# Patient Record
Sex: FEMALE | Race: WHITE | Employment: OTHER | URBAN - NONMETROPOLITAN AREA
[De-identification: names, ages, dates, MRNs, and addresses within clinical notes are randomized per-mention and may not be internally consistent; named-entity substitution may affect disease eponyms.]

---

## 2020-11-07 ENCOUNTER — HOSPITAL ENCOUNTER (EMERGENCY)
Age: 60
Discharge: HOME | End: 2020-11-07

## 2020-11-07 VITALS
HEIGHT: 68 IN | SYSTOLIC BLOOD PRESSURE: 182 MMHG | WEIGHT: 180 LBS | BODY MASS INDEX: 27.28 KG/M2 | HEART RATE: 84 BPM | RESPIRATION RATE: 18 BRPM | DIASTOLIC BLOOD PRESSURE: 78 MMHG | TEMPERATURE: 98.5 F | OXYGEN SATURATION: 96 %

## 2020-11-07 LAB
BASOPHILS # BLD: 0.5 %
BASOPHILS ABSOLUTE: 0.1 THOU/MM3 (ref 0–0.1)
EOSINOPHIL # BLD: 1.6 %
EOSINOPHILS ABSOLUTE: 0.2 THOU/MM3 (ref 0–0.4)
HCT VFR BLD CALC: 38.2 % (ref 37–47)
HEMOGLOBIN: 13.1 GM/DL (ref 12–16)
IMMATURE GRANS (ABS): 0.04 THOU/MM3 (ref 0–0.07)
IMMATURE GRANULOCYTES: 0.4 %
LYMPHOCYTES # BLD: 19.7 %
LYMPHOCYTES ABSOLUTE: 2.1 THOU/MM3 (ref 1–4.8)
MCH RBC QN AUTO: 29.8 PG (ref 27–31)
MCHC RBC AUTO-ENTMCNC: 34.3 GM/DL (ref 33–37)
MCV RBC AUTO: 87 FL (ref 81–99)
MONOCYTES # BLD: 11.9 %
MONOCYTES ABSOLUTE: 1.3 THOU/MM3 (ref 0.4–1.3)
NUCLEATED RED BLOOD CELLS: 0 /100 WBC
PDW BLD-RTO: 12.9 % (ref 11.5–14.5)
PLATELET # BLD: 276 THOU/MM3 (ref 130–400)
PMV BLD AUTO: 9.4 FL (ref 7.4–10.4)
RBC # BLD: 4.4 MILL/MM3 (ref 4.2–5.4)
SEG NEUTROPHILS: 65.9 %
SEGMENTED NEUTROPHILS ABSOLUTE COUNT: 7.1 THOU/MM3 (ref 1.8–7.7)
WBC # BLD: 10.8 THOU/MM3 (ref 4.8–10.8)

## 2020-11-07 PROCEDURE — 99203 OFFICE O/P NEW LOW 30 MIN: CPT | Performed by: NURSE PRACTITIONER

## 2020-11-07 PROCEDURE — 87147 CULTURE TYPE IMMUNOLOGIC: CPT

## 2020-11-07 PROCEDURE — 87075 CULTR BACTERIA EXCEPT BLOOD: CPT

## 2020-11-07 PROCEDURE — 87070 CULTURE OTHR SPECIMN AEROBIC: CPT

## 2020-11-07 PROCEDURE — 87205 SMEAR GRAM STAIN: CPT

## 2020-11-07 PROCEDURE — 99204 OFFICE O/P NEW MOD 45 MIN: CPT

## 2020-11-07 PROCEDURE — 36415 COLL VENOUS BLD VENIPUNCTURE: CPT

## 2020-11-07 PROCEDURE — 85025 COMPLETE CBC W/AUTO DIFF WBC: CPT

## 2020-11-07 RX ORDER — SULFAMETHOXAZOLE AND TRIMETHOPRIM 800; 160 MG/1; MG/1
1 TABLET ORAL 2 TIMES DAILY
Qty: 20 TABLET | Refills: 0 | Status: SHIPPED | OUTPATIENT
Start: 2020-11-07 | End: 2020-11-17

## 2020-11-07 RX ORDER — M-VIT,TX,IRON,MINS/CALC/FOLIC 27MG-0.4MG
1 TABLET ORAL DAILY
COMMUNITY

## 2020-11-07 ASSESSMENT — PAIN DESCRIPTION - DESCRIPTORS: DESCRIPTORS: DULL;ACHING

## 2020-11-07 ASSESSMENT — ENCOUNTER SYMPTOMS
SHORTNESS OF BREATH: 0
CHEST TIGHTNESS: 0
WHEEZING: 0
COUGH: 0

## 2020-11-07 ASSESSMENT — PAIN SCALES - GENERAL: PAINLEVEL_OUTOF10: 5

## 2020-11-07 ASSESSMENT — PAIN - FUNCTIONAL ASSESSMENT: PAIN_FUNCTIONAL_ASSESSMENT: PREVENTS OR INTERFERES SOME ACTIVE ACTIVITIES AND ADLS

## 2020-11-07 ASSESSMENT — PAIN DESCRIPTION - ORIENTATION: ORIENTATION: LEFT

## 2020-11-07 ASSESSMENT — PAIN DESCRIPTION - LOCATION: LOCATION: TOE (COMMENT WHICH ONE)

## 2020-11-07 ASSESSMENT — PAIN DESCRIPTION - PAIN TYPE: TYPE: ACUTE PAIN

## 2020-11-07 ASSESSMENT — PAIN DESCRIPTION - FREQUENCY: FREQUENCY: CONTINUOUS

## 2020-11-07 ASSESSMENT — PAIN DESCRIPTION - ONSET: ONSET: ON-GOING

## 2020-11-07 ASSESSMENT — PAIN DESCRIPTION - PROGRESSION: CLINICAL_PROGRESSION: GRADUALLY WORSENING

## 2020-11-07 NOTE — ED NOTES
Left foot and lower leg swelling noted with great toe reddened with open sore on bottom. Purulent blisters noted around great toe without current drainage. Pt co yellowish drainage last evening from top of toe.      Tiffanie Kay RN  11/07/20 6330

## 2020-11-07 NOTE — ED NOTES
PT GIVEN DISCHARGE INSTRUCTIONS, VERBALIZES UNDERSTANDING. PT ASSESSMENT UNCHANGED, DISCHARGED IN STABLE CONDITION.         Audrey Joyner RN  11/07/20 4739

## 2020-11-07 NOTE — ED PROVIDER NOTES
Patient  reports that she has never smoked. She has never used smokeless tobacco. She reports that she does not drink alcohol or use drugs. PHYSICAL EXAM     ED TRIAGE VITALS  BP: (!) 182/78, Temp: 98.5 °F (36.9 °C), Pulse: 84, Resp: 18, SpO2: 96 %,Estimated body mass index is 27.37 kg/m² as calculated from the following:    Height as of this encounter: 5' 8\" (1.727 m). Weight as of this encounter: 180 lb (81.6 kg). ,No LMP recorded. Physical Exam  Constitutional:       Appearance: Normal appearance. Cardiovascular:      Pulses: Normal pulses. Pulmonary:      Effort: Pulmonary effort is normal. No respiratory distress. Musculoskeletal: Normal range of motion. General: Swelling and tenderness (left great toe) present. No signs of injury. Skin:     General: Skin is warm. Capillary Refill: Capillary refill takes 2 to 3 seconds. Coloration: Skin is not jaundiced or pale. Findings: Erythema present. No bruising, lesion or rash. Neurological:      General: No focal deficit present. Mental Status: She is alert and oriented to person, place, and time. Sensory: No sensory deficit. Psychiatric:         Mood and Affect: Mood normal.         Behavior: Behavior normal.         Thought Content:  Thought content normal.         Judgment: Judgment normal.         DIAGNOSTIC RESULTS     Labs:  Results for orders placed or performed during the hospital encounter of 11/07/20   CBC auto differential   Result Value Ref Range    WBC 10.8 4.8 - 10.8 thou/mm3    RBC 4.40 4.20 - 5.40 mill/mm3    Hemoglobin 13.1 12.0 - 16.0 gm/dl    Hematocrit 38.2 37.0 - 47.0 %    MCV 87 81 - 99 fL    MCH 29.8 27.0 - 31.0 pg    MCHC 34.3 33.0 - 37.0 gm/dl    RDW 12.9 11.5 - 14.5 %    Platelets 863 239 - 170 thou/mm3    MPV 9.4 7.4 - 10.4 fL       IMAGING:    No orders to display     URGENT CARE COURSE:     Vitals:    11/07/20 1202   BP: (!) 182/78   Pulse: 84   Resp: 18   Temp: 98.5 °F (36.9 °C) TempSrc: Temporal   SpO2: 96%   Weight: 180 lb (81.6 kg)   Height: 5' 8\" (1.727 m)       Medications - No data to display         PROCEDURES:  None    FINAL IMPRESSION      1. Abscess    2. Cellulitis, unspecified cellulitis site          DISPOSITION/ PLAN   Patient is discharged home with prescription for Bactrim as well as Bactroban ointment. Discussed with patient that she will need to follow-up with primary care provider, or return to the urgent care within the next 2 to 3 days for reevaluation of wound, as there is concern of increased risk for poor wound healing or sepsis given her history of diabetes. Discussed with patient that she may use Epson salt soaks and adjunct to antibiotic. Aerobic and anaerobic cultures were sent, and can take up to 3 days to be resulted. Discussed with patient that labs were reviewed. If she develops any fevers, she should go directly to the ER. PATIENT REFERRED TO:  No primary care provider on file. No primary physician on file.       DISCHARGE MEDICATIONS:  Discharge Medication List as of 11/7/2020 12:55 PM      START taking these medications    Details   sulfamethoxazole-trimethoprim (BACTRIM DS;SEPTRA DS) 800-160 MG per tablet Take 1 tablet by mouth 2 times daily for 10 days, Disp-20 tablet,R-0Print      mupirocin (BACTROBAN) 2 % ointment Apply topically 3 times daily to left great toe for 1 week, Disp-1 Tube,R-0, Print             Discharge Medication List as of 11/7/2020 12:55 PM      STOP taking these medications       fish oil-omega-3 fatty acids 1000 MG capsule Comments:   Reason for Stopping:               Discharge Medication List as of 11/7/2020 12:55 PM          KRISTA Horne NP    (Please note that portions of this note were completed with a voice recognition program. Efforts were made to edit the dictations but occasionally words are mis-transcribed.)         KRISTA Chanel NP  11/07/20 5291

## 2020-11-09 ENCOUNTER — APPOINTMENT (OUTPATIENT)
Dept: GENERAL RADIOLOGY | Age: 60
End: 2020-11-09

## 2020-11-09 ENCOUNTER — HOSPITAL ENCOUNTER (EMERGENCY)
Age: 60
Discharge: HOME | End: 2020-11-09

## 2020-11-09 VITALS
SYSTOLIC BLOOD PRESSURE: 166 MMHG | TEMPERATURE: 97.7 F | DIASTOLIC BLOOD PRESSURE: 77 MMHG | RESPIRATION RATE: 14 BRPM | HEART RATE: 81 BPM | OXYGEN SATURATION: 96 %

## 2020-11-09 LAB
BASOPHILS # BLD: 0.7 %
BASOPHILS ABSOLUTE: 0.1 THOU/MM3 (ref 0–0.1)
EOSINOPHIL # BLD: 2.9 %
EOSINOPHILS ABSOLUTE: 0.2 THOU/MM3 (ref 0–0.4)
HCT VFR BLD CALC: 38.7 % (ref 37–47)
HEMOGLOBIN: 13.6 GM/DL (ref 12–16)
IMMATURE GRANS (ABS): 0.03 THOU/MM3 (ref 0–0.07)
IMMATURE GRANULOCYTES: 0.4 %
LYMPHOCYTES # BLD: 20.4 %
LYMPHOCYTES ABSOLUTE: 1.7 THOU/MM3 (ref 1–4.8)
MCH RBC QN AUTO: 30.8 PG (ref 27–31)
MCHC RBC AUTO-ENTMCNC: 35.1 GM/DL (ref 33–37)
MCV RBC AUTO: 88 FL (ref 81–99)
MONOCYTES # BLD: 12.6 %
MONOCYTES ABSOLUTE: 1 THOU/MM3 (ref 0.4–1.3)
NUCLEATED RED BLOOD CELLS: 0 /100 WBC
PDW BLD-RTO: 12.7 % (ref 11.5–14.5)
PLATELET # BLD: 305 THOU/MM3 (ref 130–400)
PMV BLD AUTO: 9.4 FL (ref 7.4–10.4)
RBC # BLD: 4.42 MILL/MM3 (ref 4.2–5.4)
SEG NEUTROPHILS: 63 %
SEGMENTED NEUTROPHILS ABSOLUTE COUNT: 5.2 THOU/MM3 (ref 1.8–7.7)
WBC # BLD: 8.3 THOU/MM3 (ref 4.8–10.8)

## 2020-11-09 PROCEDURE — 36415 COLL VENOUS BLD VENIPUNCTURE: CPT

## 2020-11-09 PROCEDURE — 85025 COMPLETE CBC W/AUTO DIFF WBC: CPT

## 2020-11-09 PROCEDURE — 99214 OFFICE O/P EST MOD 30 MIN: CPT

## 2020-11-09 PROCEDURE — 73630 X-RAY EXAM OF FOOT: CPT

## 2020-11-09 PROCEDURE — 99214 OFFICE O/P EST MOD 30 MIN: CPT | Performed by: NURSE PRACTITIONER

## 2020-11-09 RX ORDER — CLINDAMYCIN HYDROCHLORIDE 300 MG/1
300 CAPSULE ORAL 3 TIMES DAILY
Qty: 30 CAPSULE | Refills: 0 | Status: SHIPPED | OUTPATIENT
Start: 2020-11-09 | End: 2020-11-19

## 2020-11-09 ASSESSMENT — PAIN DESCRIPTION - FREQUENCY: FREQUENCY: CONTINUOUS

## 2020-11-09 ASSESSMENT — PAIN DESCRIPTION - LOCATION: LOCATION: TOE (COMMENT WHICH ONE)

## 2020-11-09 ASSESSMENT — ENCOUNTER SYMPTOMS: SHORTNESS OF BREATH: 0

## 2020-11-09 ASSESSMENT — PAIN DESCRIPTION - ORIENTATION: ORIENTATION: LEFT

## 2020-11-09 ASSESSMENT — PAIN SCALES - GENERAL: PAINLEVEL_OUTOF10: 3

## 2020-11-09 ASSESSMENT — PAIN DESCRIPTION - PAIN TYPE: TYPE: ACUTE PAIN

## 2020-11-09 ASSESSMENT — PAIN DESCRIPTION - DESCRIPTORS: DESCRIPTORS: DISCOMFORT

## 2020-11-09 NOTE — ED PROVIDER NOTES
[address]  Urgent Care Encounter       CHIEF COMPLAINT       Chief Complaint   Patient presents with    Wound Infection     left big toe        Nurses Notes reviewed and I agree except as noted in the HPI. HISTORY OF PRESENT ILLNESS   Marielena Head is a 63 y.o. female who was seen here on Saturday for a wound on her left foot/great toe, she presents today for a recheck. She reports she has been taking her antibiotic as prescribed and is elevating her foot as advised. Pt reports she was performing ankle rotations this morning and noticed drainage coming from the top of her foot, states it did relieve the pressure felt in that area. She states her foot looks much better today than it did 2 days ago. She denies fever, chills, nausea or vomiting. Patient does not have a family doctor and has not seen a podiatrist regarding this foot wound. Patient is diabetic. The history is provided by the patient. REVIEW OF SYSTEMS     Review of Systems   Constitutional: Negative for chills and fever. Respiratory: Negative for shortness of breath. Cardiovascular: Negative for chest pain. Skin: Positive for wound (left foot/great toe). Neurological: Negative for dizziness, light-headedness and headaches. PAST MEDICAL HISTORY         Diagnosis Date    Asthma     Diabetes mellitus ([facility] [address]        SURGICALHISTORY     Patient  has a past surgical history that includes [place] tooth extraction and Tubal ligation.     CURRENT MEDICATIONS       Discharge Medication List as of 11/9/2020  5:58 PM      CONTINUE these medications which have NOT CHANGED    Details   Multiple Vitamins-Minerals (THERAPEUTIC MULTIVITAMIN-MINERALS) tablet Take 1 tablet by mouth dailyHistorical Med      ALPHA LIPOIC ACID PO Take by mouthHistorical Med      sulfamethoxazole-trimethoprim (BACTRIM DS;SEPTRA DS) 800-160 MG per tablet Take 1 tablet by mouth 2 times daily for 10 days, Disp-20 tablet,R-0Print      mupirocin (BACTROBAN) 2 % ointment Apply topically 3 times daily to left great toe for 1 week, Disp-1 Tube,R-0, Print      acetaminophen (TYLENOL) 325 MG tablet Take 650 mg by mouth every 6 hours as needed. ALLERGIES     Patient is is allergic to codeine. Patients   There is no immunization history on file for this patient. FAMILY HISTORY     Patient's family history is not on file. SOCIAL HISTORY     Patient  reports that she has never smoked. She has never used smokeless tobacco. She reports that she does not drink alcohol or use drugs. PHYSICAL EXAM     ED TRIAGE VITALS  BP: (!) 166/77, Temp: 97.7 °F (36.5 °C), Pulse: 81, Resp: 14, SpO2: 96 %,Estimated body mass index is 27.37 kg/m² as calculated from the following:    Height as of 11/7/20: 5' 8\" (1.727 m). Weight as of 11/7/20: 180 lb (81.6 kg). ,No LMP recorded. Patient is postmenopausal.    Physical Exam  Constitutional:       Appearance: Normal appearance. HENT:      Head: Normocephalic and atraumatic. Cardiovascular:      Rate and Rhythm: Normal rate and regular rhythm. Pulmonary:      Effort: Pulmonary effort is normal.      Breath sounds: Normal breath sounds. Musculoskeletal:        Feet:    Skin:     General: Skin is warm. Findings: Wound present. Comments: Deep diabetic foot ulcer to the plantar surface of the left great toe with thick yellow callus surrounding. The lateral and dorsal aspect of the great toe is macerated with open areas and purulent drainage. Cellulitis to the toe extending onto the dorsum of the foot. This area is warm and slightly tender and swollen. Neurological:      General: No focal deficit present. Mental Status: She is alert and oriented to person, place, and time.                  DIAGNOSTIC RESULTS     Labs:  Results for orders placed or performed during the hospital encounter of 11/09/20   CBC auto differential   Result Value Ref Range    WBC 8.3 4.8 - 10.8 thou/mm3    RBC 4.42 4.20 - 5.40 mill/mm3    Hemoglobin 13.6 12.0 - 16.0 gm/dl    Hematocrit 38.7 37.0 - 47.0 %    MCV 88 81 - 99 fL    MCH 30.8 27.0 - 31.0 pg    MCHC 35.1 33.0 - 37.0 gm/dl    RDW 12.7 11.5 - 14.5 %    Platelets 883 391 - 807 thou/mm3    MPV 9.4 7.4 - 10.4 fL   Differential   Result Value Ref Range    Seg Neutrophils 63.0 %    Lymphocytes 20.4 %    Monocytes 12.6 %    Eosinophils 2.9 %    Basophils 0.7 %    Immature Granulocytes 0.4 %    Segs Absolute 5.2 1.8 - 7.7 thou/mm3    Lymphocytes Absolute 1.7 1.0 - 4.8 thou/mm3    Monocytes Absolute 1.0 0.4 - 1.3 thou/mm3    Eosinophils Absolute 0.2 0.0 - 0.4 thou/mm3    Basophils Absolute 0.1 0.0 - 0.1 thou/mm3    Immature Grans (Abs) 0.03 0.00 - 0.07 thou/mm3    nRBC 0 /100 wbc       IMAGING:    XR FOOT LEFT (MIN 3 VIEWS)   Final Result      Soft tissue ulcer and soft tissue swelling. No gross bone destruction. Contrast MRI would be helpful if indicated clinically. **This report has been created using voice recognition software. It may contain minor errors which are inherent in voice recognition technology. **      Final report electronically signed by Dr. Jose Hickman on 11/9/2020 5:32 PM            EKG:      URGENT CARE COURSE:     Vitals:    11/09/20 1605   BP: (!) 166/77   Pulse: 81   Resp: 14   Temp: 97.7 °F (36.5 °C)   TempSrc: Temporal   SpO2: 96%       Medications - No data to display         PROCEDURES:  None    FINAL IMPRESSION      1. Diabetic ulcer of toe of left foot associated with type 2 diabetes mellitus, unspecified ulcer stage ([address]    2. Infected wound    3. Cellulitis of great toe of left foot          DISPOSITION/ PLAN     Patient presents with a diabetic foot wound with infected wound and cellulitis of the great toe of the left foot. Cellulitis extends onto the dorsum of the foot. X-ray completed and no evidence of osteomyelitis patient reports that it actually looks better than it did 2 days ago since starting Bactrim DS.   States swelling has gone down. Photos of the wound 2 days ago and currently do not show great improvement however the patient does believe it has improved. She is not having any systemic symptoms. White count rechecked and has decreased to 8.3. Patient will continue Bactrim DS. Clindamycin added for additional coverage. Patient was instructed on the importance the need for good diabetes management and blood glucose control. Discussed potential negative effects of uncontrolled diabetes including microvascular and macrovascular complications. Strongly encourage patient to apply for Medicaid she does not have insurance. Also strongly encourage patient to schedule a follow-up appointment with podiatry as soon as possible. Contact information was provided. Patient to return to urgent care in the next several days if unable to see podiatry in a reasonable amount of time. She is to go to ER with any  systemic symptoms for evaluation. Further instructions were outlined verbally and in the patient's discharge instructions. All the patient's questions were answered. The patient/parent agreed with the plan and was discharged from the [facility] in good condition. PATIENT REFERRED TO:  No primary care provider on file. No primary physician on file.       DISCHARGE MEDICATIONS:  Discharge Medication List as of 11/9/2020  5:58 PM      START taking these medications    Details   clindamycin (CLEOCIN) 300 MG capsule Take 1 capsule by mouth 3 times daily for 10 days, Disp-30 capsule,R-0Normal             Discharge Medication List as of 11/9/2020  5:58 PM          Discharge Medication List as of 11/9/2020  5:58 PM          KRISTA Lan CNP    (Please note that portions of this note were completed with a voice recognition program. Efforts were made to edit the dictations but occasionally words are mis-transcribed.)         KRISTA Lan CNP  11/10/20 0892

## 2020-11-09 NOTE — ED NOTES
Wound on left great toe/foot area cleaned with wound cleanser and wrapped with gauze. Discharge assessment complete. No changes. All discharge education and information given. Instructed to go to ED for any worsening symptoms. Verbalized Understanding. Left in stable cond.      Maurizio Vega RN  11/09/20 4869

## 2020-11-09 NOTE — ED TRIAGE NOTES
Patient walked to room 7 for wound recheck of left big toe. Patient on Bactrim and Bactroban since Saturday. No other needs.

## 2020-11-10 LAB
AEROBIC CULTURE: NORMAL
ANAEROBIC CULTURE: NORMAL
GRAM STAIN RESULT: NORMAL

## 2024-08-21 ENCOUNTER — HOSPITAL ENCOUNTER (EMERGENCY)
Age: 64
Discharge: HOME | End: 2024-08-21
Attending: STUDENT IN AN ORGANIZED HEALTH CARE EDUCATION/TRAINING PROGRAM

## 2024-08-21 ENCOUNTER — APPOINTMENT (OUTPATIENT)
Dept: GENERAL RADIOLOGY | Age: 64
End: 2024-08-21

## 2024-08-21 VITALS
HEIGHT: 68 IN | RESPIRATION RATE: 15 BRPM | DIASTOLIC BLOOD PRESSURE: 85 MMHG | BODY MASS INDEX: 26.98 KG/M2 | TEMPERATURE: 98 F | SYSTOLIC BLOOD PRESSURE: 177 MMHG | OXYGEN SATURATION: 100 % | WEIGHT: 178 LBS | HEART RATE: 93 BPM

## 2024-08-21 DIAGNOSIS — S82.032A CLOSED DISPLACED TRANSVERSE FRACTURE OF LEFT PATELLA, INITIAL ENCOUNTER: Primary | ICD-10-CM

## 2024-08-21 PROCEDURE — 6370000000 HC RX 637 (ALT 250 FOR IP)

## 2024-08-21 PROCEDURE — 99283 EMERGENCY DEPT VISIT LOW MDM: CPT

## 2024-08-21 PROCEDURE — 73564 X-RAY EXAM KNEE 4 OR MORE: CPT

## 2024-08-21 RX ORDER — IBUPROFEN 200 MG
600 TABLET ORAL
Status: COMPLETED | OUTPATIENT
Start: 2024-08-21 | End: 2024-08-21

## 2024-08-21 RX ADMIN — IBUPROFEN 600 MG: 200 TABLET, FILM COATED ORAL at 20:29

## 2024-08-21 ASSESSMENT — PAIN - FUNCTIONAL ASSESSMENT: PAIN_FUNCTIONAL_ASSESSMENT: 0-10

## 2024-08-21 ASSESSMENT — PAIN SCALES - GENERAL: PAINLEVEL_OUTOF10: 5

## 2024-08-21 NOTE — ED NOTES
Pt arrives to ED from home with c/o fall about 530pm today. Pt states she was walking and tripped, running her left knee into the door frame and hitting her face on the door as well. Was able to be helped up but is having trouble putting weight on that knee

## 2024-08-22 NOTE — ED PROVIDER NOTES
[facility] EMERGENCY DEPT  EMERGENCY DEPARTMENT ENCOUNTER          Pt Name: Nadia Marr  MRN: [id number]  Birthdate 1960  Date of evaluation: 8/21/2024  Physician: Alexandro Almaguer MD  Supervising Attending Physician: Nikolay Boles DO       CHIEF COMPLAINT       Chief Complaint   Patient presents with    Fall    Knee Pain    Facial Pain         HISTORY OF PRESENT ILLNESS    HPI  Nadia Marr is a 63 y.o. female who presents to the emergency department from home, by private vehicle for evaluation of fall    Patient presents to the ED complaining of a mechanical fall she tripped and fell landing on the left side of her head hitting her right ear to the ground.  Did not lose consciousness was able to get up initially however the pain in the knee has increased and she is no longer able to bear weight on her left leg.  She denies any other injuries she denies any headache double vision weakness or numbness in any extremities.  She reports knee pain that is tolerable on sitting however increased significantly upon ambulation.  She is not on any blood thinners  The patient has no other acute complaints at this time.      REVIEW OF SYSTEMS   Review of Systems      PAST MEDICAL AND SURGICAL HISTORY     Past Medical History:   Diagnosis Date    Asthma     Diabetes mellitus (HCC)      Past Surgical History:   Procedure Laterality Date    TUBAL LIGATION      WISDOM TOOTH EXTRACTION           MEDICATIONS   No current facility-administered medications for this encounter.    Current Outpatient Medications:     Multiple Vitamins-Minerals (THERAPEUTIC MULTIVITAMIN-MINERALS) tablet, Take 1 tablet by mouth daily, Disp: , Rfl:     ALPHA LIPOIC ACID PO, Take by mouth, Disp: , Rfl:     acetaminophen (TYLENOL) 325 MG tablet, Take 650 mg by mouth every 6 hours as needed.  , Disp: , Rfl:     Previous Medications    ACETAMINOPHEN (TYLENOL) 325 MG TABLET    Take 650 mg by mouth every 6 hours as needed.      ALPHA LIPOIC

## 2024-08-22 NOTE — DISCHARGE INSTRUCTIONS
Use Tylenol ibuprofen as needed for pain.  Always take ibuprofen with a meal and plenty of fluids.  Avoid using it for longer than 5 days    Go to orthopedics tomorrow for evaluation and further management of your fracture.  You can go as a walk-in no appointment needed.    Return to the emergency department immediately if there is any new or concerning symptom.